# Patient Record
Sex: FEMALE | Race: BLACK OR AFRICAN AMERICAN | NOT HISPANIC OR LATINO | Employment: FULL TIME | URBAN - METROPOLITAN AREA
[De-identification: names, ages, dates, MRNs, and addresses within clinical notes are randomized per-mention and may not be internally consistent; named-entity substitution may affect disease eponyms.]

---

## 2022-11-09 ENCOUNTER — OFFICE VISIT (OUTPATIENT)
Dept: FAMILY MEDICINE | Facility: CLINIC | Age: 37
End: 2022-11-09
Payer: OTHER GOVERNMENT

## 2022-11-09 VITALS
OXYGEN SATURATION: 99 % | BODY MASS INDEX: 33.8 KG/M2 | WEIGHT: 198 LBS | DIASTOLIC BLOOD PRESSURE: 88 MMHG | HEART RATE: 78 BPM | SYSTOLIC BLOOD PRESSURE: 122 MMHG | HEIGHT: 64 IN

## 2022-11-09 DIAGNOSIS — Z00.01 ENCOUNTER FOR GENERAL ADULT MEDICAL EXAMINATION WITH ABNORMAL FINDINGS: Primary | ICD-10-CM

## 2022-11-09 DIAGNOSIS — M70.60 GREATER TROCHANTERIC BURSITIS, UNSPECIFIED LATERALITY: ICD-10-CM

## 2022-11-09 DIAGNOSIS — Z30.9 ENCOUNTER FOR CONTRACEPTIVE MANAGEMENT, UNSPECIFIED TYPE: ICD-10-CM

## 2022-11-09 PROBLEM — J30.9 ALLERGIC RHINITIS: Status: ACTIVE | Noted: 2022-11-09

## 2022-11-09 PROBLEM — M67.439 GANGLION OF WRIST: Status: ACTIVE | Noted: 2022-11-09

## 2022-11-09 PROBLEM — H52.229 REGULAR ASTIGMATISM: Status: ACTIVE | Noted: 2022-11-09

## 2022-11-09 PROCEDURE — 99385 PR PREVENTIVE VISIT,NEW,18-39: ICD-10-PCS | Mod: S$PBB,,, | Performed by: STUDENT IN AN ORGANIZED HEALTH CARE EDUCATION/TRAINING PROGRAM

## 2022-11-09 PROCEDURE — 99999 PR PBB SHADOW E&M-EST. PATIENT-LVL III: ICD-10-PCS | Mod: PBBFAC,,, | Performed by: STUDENT IN AN ORGANIZED HEALTH CARE EDUCATION/TRAINING PROGRAM

## 2022-11-09 PROCEDURE — 99213 OFFICE O/P EST LOW 20 MIN: CPT | Mod: PBBFAC,PO | Performed by: STUDENT IN AN ORGANIZED HEALTH CARE EDUCATION/TRAINING PROGRAM

## 2022-11-09 PROCEDURE — 99385 PREV VISIT NEW AGE 18-39: CPT | Mod: S$PBB,,, | Performed by: STUDENT IN AN ORGANIZED HEALTH CARE EDUCATION/TRAINING PROGRAM

## 2022-11-09 PROCEDURE — 99999 PR PBB SHADOW E&M-EST. PATIENT-LVL III: CPT | Mod: PBBFAC,,, | Performed by: STUDENT IN AN ORGANIZED HEALTH CARE EDUCATION/TRAINING PROGRAM

## 2022-11-09 RX ORDER — CHLORHEXIDINE GLUCONATE ORAL RINSE 1.2 MG/ML
SOLUTION DENTAL
COMMUNITY
Start: 2021-11-30

## 2022-11-09 RX ORDER — NORGESTIMATE AND ETHINYL ESTRADIOL 0.25-0.035
1 KIT ORAL DAILY
Qty: 90 TABLET | Refills: 4 | Status: SHIPPED | OUTPATIENT
Start: 2022-11-09 | End: 2022-11-10

## 2022-11-09 NOTE — PROGRESS NOTES
"Subjective:       Patient ID: Nisha Reyes is a 37 y.o. female.    Chief Complaint: paperwork (Needs paperwork filled out for the ) and Annual Exam    Presents to establish care, reports bilateral hip pain x years, incompletely relieved by corticosteroid injections, requests referral for consider of surgical management    Compliant with OCPs, denies adverse effects from medications    Review of Systems   Constitutional:  Negative for chills, fever and malaise/fatigue.   HENT:  Negative for congestion, ear pain, sinus pain and sore throat.    Respiratory:  Negative for cough, sputum production, shortness of breath and wheezing.    Cardiovascular:  Negative for chest pain and palpitations.   Gastrointestinal:  Negative for abdominal pain, constipation, diarrhea, heartburn, nausea and vomiting.   Genitourinary:  Negative for dysuria.   Musculoskeletal:  Negative for myalgias.   Skin:  Negative for rash.   Neurological:  Negative for dizziness, weakness and headaches.   Psychiatric/Behavioral:  Negative for depression. The patient does not have insomnia.       Objective:      Vitals:    11/09/22 0928   BP: 122/88   Pulse: 78   SpO2: 99%   Weight: 89.8 kg (197 lb 15.6 oz)   Height: 5' 4" (1.626 m)      Physical Exam  Constitutional:       General: She is not in acute distress.  Eyes:      General: No scleral icterus.     Conjunctiva/sclera: Conjunctivae normal.   Neck:      Comments: No cervical lymphadenopathy  Cardiovascular:      Comments: Good skin turgor, No edema  Pulmonary:      Comments: Respirations symmetric and not labored  Musculoskeletal:         General: Normal range of motion.      Cervical back: Neck supple.      Comments: Normal strength   Skin:     General: Skin is warm and dry.      Findings: No lesion or rash.   Neurological:      General: No focal deficit present.      Mental Status: She is alert and oriented to person, place, and time.   Psychiatric:         Mood and Affect: Mood normal.    "   Comments: Cooperative, Appropriate affect        Assessment:       1. Encounter for general adult medical examination with abnormal findings    2. Greater trochanteric bursitis, unspecified laterality    3. Encounter for contraceptive management, unspecified type          Plan:       Encounter for general adult medical examination with abnormal findings  Concerns and questions addressed, follow up preventative screenings, counseling and education provided    Greater trochanteric bursitis, unspecified laterality  -     Ambulatory referral/consult to Orthopedics; Future; Expected date: 11/16/2022  Refractory to corticosteroid injections, has previous imaging performed at OSH, will follow up orthopedic recommendations, paperwork completed in clinic today    Encounter for contraceptive management, unspecified type  -     norgestimate-ethinyl estradioL (ORTHO-CYCLEN) 0.25-35 mg-mcg per tablet; Take 1 tablet by mouth once daily.  Dispense: 90 tablet; Refill: 4  Stable, BP at goal, continue current regimen, will appreciate GYN recommendations tomorrow    Risks, benefits, alternatives discussed with patient, asked if there were any questions, patient said no  Precautions, counseling and education provided, patient verbalized understanding

## 2022-11-10 ENCOUNTER — OFFICE VISIT (OUTPATIENT)
Dept: ORTHOPEDICS | Facility: CLINIC | Age: 37
End: 2022-11-10
Payer: OTHER GOVERNMENT

## 2022-11-10 ENCOUNTER — HOSPITAL ENCOUNTER (OUTPATIENT)
Dept: RADIOLOGY | Facility: HOSPITAL | Age: 37
Discharge: HOME | End: 2022-11-10
Attending: NURSE PRACTITIONER
Payer: OTHER GOVERNMENT

## 2022-11-10 ENCOUNTER — OFFICE VISIT (OUTPATIENT)
Dept: OBSTETRICS AND GYNECOLOGY | Facility: CLINIC | Age: 37
End: 2022-11-10
Payer: OTHER GOVERNMENT

## 2022-11-10 VITALS
WEIGHT: 197.06 LBS | DIASTOLIC BLOOD PRESSURE: 76 MMHG | RESPIRATION RATE: 16 BRPM | SYSTOLIC BLOOD PRESSURE: 134 MMHG | HEIGHT: 64 IN | BODY MASS INDEX: 33.64 KG/M2

## 2022-11-10 VITALS — BODY MASS INDEX: 33.63 KG/M2 | WEIGHT: 197 LBS | HEIGHT: 64 IN

## 2022-11-10 DIAGNOSIS — M70.60 GREATER TROCHANTERIC BURSITIS, UNSPECIFIED LATERALITY: ICD-10-CM

## 2022-11-10 DIAGNOSIS — M25.551 BILATERAL HIP PAIN: ICD-10-CM

## 2022-11-10 DIAGNOSIS — M25.551 BILATERAL HIP PAIN: Primary | ICD-10-CM

## 2022-11-10 DIAGNOSIS — M25.552 BILATERAL HIP PAIN: Primary | ICD-10-CM

## 2022-11-10 DIAGNOSIS — M25.552 BILATERAL HIP PAIN: ICD-10-CM

## 2022-11-10 DIAGNOSIS — Z01.419 WELL WOMAN EXAM: Primary | ICD-10-CM

## 2022-11-10 DIAGNOSIS — M25.559 PAIN IN UNSPECIFIED HIP: Primary | ICD-10-CM

## 2022-11-10 PROCEDURE — 73521 X-RAY EXAM HIPS BI 2 VIEWS: CPT | Mod: 26,,, | Performed by: RADIOLOGY

## 2022-11-10 PROCEDURE — 99385 PR PREVENTIVE VISIT,NEW,18-39: ICD-10-PCS | Mod: S$PBB,,, | Performed by: STUDENT IN AN ORGANIZED HEALTH CARE EDUCATION/TRAINING PROGRAM

## 2022-11-10 PROCEDURE — 73521 XR HIPS BILATERAL 2 VIEW INCL AP PELVIS: ICD-10-PCS | Mod: 26,,, | Performed by: RADIOLOGY

## 2022-11-10 PROCEDURE — 73521 X-RAY EXAM HIPS BI 2 VIEWS: CPT | Mod: TC,PO

## 2022-11-10 PROCEDURE — 99214 PR OFFICE/OUTPT VISIT, EST, LEVL IV, 30-39 MIN: ICD-10-PCS | Mod: S$PBB,25,, | Performed by: NURSE PRACTITIONER

## 2022-11-10 PROCEDURE — 99214 OFFICE O/P EST MOD 30 MIN: CPT | Mod: S$PBB,25,, | Performed by: NURSE PRACTITIONER

## 2022-11-10 PROCEDURE — 99213 OFFICE O/P EST LOW 20 MIN: CPT | Mod: PBBFAC,PN | Performed by: STUDENT IN AN ORGANIZED HEALTH CARE EDUCATION/TRAINING PROGRAM

## 2022-11-10 PROCEDURE — 99999 PR PBB SHADOW E&M-EST. PATIENT-LVL III: CPT | Mod: PBBFAC,,, | Performed by: STUDENT IN AN ORGANIZED HEALTH CARE EDUCATION/TRAINING PROGRAM

## 2022-11-10 PROCEDURE — 99214 OFFICE O/P EST MOD 30 MIN: CPT | Mod: PBBFAC,27,PN,25 | Performed by: NURSE PRACTITIONER

## 2022-11-10 PROCEDURE — 20610 LARGE JOINT ASPIRATION/INJECTION: BILATERAL GREATER TROCHANTERIC BURSA: ICD-10-PCS | Mod: 50,S$PBB,, | Performed by: NURSE PRACTITIONER

## 2022-11-10 PROCEDURE — 99999 PR PBB SHADOW E&M-EST. PATIENT-LVL III: ICD-10-PCS | Mod: PBBFAC,,, | Performed by: STUDENT IN AN ORGANIZED HEALTH CARE EDUCATION/TRAINING PROGRAM

## 2022-11-10 PROCEDURE — 87624 HPV HI-RISK TYP POOLED RSLT: CPT | Performed by: STUDENT IN AN ORGANIZED HEALTH CARE EDUCATION/TRAINING PROGRAM

## 2022-11-10 PROCEDURE — 20610 DRAIN/INJ JOINT/BURSA W/O US: CPT | Mod: 50,PBBFAC,PN | Performed by: NURSE PRACTITIONER

## 2022-11-10 PROCEDURE — 88175 CYTOPATH C/V AUTO FLUID REDO: CPT | Performed by: STUDENT IN AN ORGANIZED HEALTH CARE EDUCATION/TRAINING PROGRAM

## 2022-11-10 PROCEDURE — 99999 PR PBB SHADOW E&M-EST. PATIENT-LVL IV: CPT | Mod: PBBFAC,,, | Performed by: NURSE PRACTITIONER

## 2022-11-10 PROCEDURE — 99385 PREV VISIT NEW AGE 18-39: CPT | Mod: S$PBB,,, | Performed by: STUDENT IN AN ORGANIZED HEALTH CARE EDUCATION/TRAINING PROGRAM

## 2022-11-10 PROCEDURE — 20610 DRAIN/INJ JOINT/BURSA W/O US: CPT | Mod: 50,S$PBB,, | Performed by: NURSE PRACTITIONER

## 2022-11-10 PROCEDURE — 99999 PR PBB SHADOW E&M-EST. PATIENT-LVL IV: ICD-10-PCS | Mod: PBBFAC,,, | Performed by: NURSE PRACTITIONER

## 2022-11-10 RX ORDER — TRIAMCINOLONE ACETONIDE 40 MG/ML
40 INJECTION, SUSPENSION INTRA-ARTICULAR; INTRAMUSCULAR
Status: DISCONTINUED | OUTPATIENT
Start: 2022-11-10 | End: 2022-11-13 | Stop reason: HOSPADM

## 2022-11-10 RX ORDER — NORETHINDRONE ACETATE AND ETHINYL ESTRADIOL AND FERROUS FUMARATE 1MG-20(24)
1 KIT ORAL DAILY
Qty: 90 TABLET | Refills: 3 | Status: SHIPPED | OUTPATIENT
Start: 2022-11-10 | End: 2023-11-10

## 2022-11-10 RX ADMIN — TRIAMCINOLONE ACETONIDE 40 MG: 40 INJECTION, SUSPENSION INTRA-ARTICULAR; INTRAMUSCULAR at 11:11

## 2022-11-10 NOTE — PROCEDURES
Large Joint Aspiration/Injection: bilateral greater trochanteric bursa    Date/Time: 11/10/2022 11:20 AM  Performed by: DAWIT Menchaca  Authorized by: DAWIT Menchaca     Consent Done?:  Yes (Verbal)  Indications:  Pain  Timeout: prior to procedure the correct patient, procedure, and site was verified    Prep: patient was prepped and draped in usual sterile fashion      Local anesthesia used?: Yes    Local anesthetic:  Lidocaine 1% without epinephrine  Anesthetic total (ml):  5      Details:  Needle Size:  21 G  Approach:  Lateral  Location:  Hip  Laterality:  Bilateral  Site:  Bilateral greater trochanteric bursa  Medications (Right):  40 mg triamcinolone acetonide 40 mg/mL  Medications (Left):  40 mg triamcinolone acetonide 40 mg/mL  Patient tolerance:  Patient tolerated the procedure well with no immediate complications

## 2022-11-10 NOTE — PROGRESS NOTES
Chief Complaint   Patient presents with    Left Hip - Pain    Right Hip - Pain      HPI:   This is a 37 y.o. female who presents to clinic today for bilateral hip pain for the past > 10 years after no known trauma. Complains of pain while sleeping on side and when descending stairs. Pain is deep. No numbness or tingling. No associated signs or symptoms. Referred here by PCP, Dr. Prince for bilateral hip bursitis that she has had corticosteroid injections for in the past with no relief. Referred here more for surgical evaluation; possible bursectomy. She has significant pain with running, dead lifts. Other activities, cause pain to her bilateral hips also that include running and sometimes running with weights which I have discussed with her that she needs activity modification restrictions... meaning that she should NOT be performing any activities that cause her the pain such as dead lifts, running or running with any lbs of weight.   We discussed steroid injections to bilateral hip bursa and she is agreeable to do this, and she will follow up with Dr. Carolina near end of December to discuss possible bilateral bursectomy.       Past Medical History:   Diagnosis Date    Gestational HTN     Preeclampsia      Past Surgical History:   Procedure Laterality Date    PRK eye      right ganglion cyst removal Right     WISDOM TOOTH EXTRACTION       Current Outpatient Medications on File Prior to Visit   Medication Sig Dispense Refill    chlorhexidine (PERIDEX) 0.12 % solution       norgestimate-ethinyl estradioL (ORTHO-CYCLEN) 0.25-35 mg-mcg per tablet Take 1 tablet by mouth once daily. 90 tablet 4     No current facility-administered medications on file prior to visit.     Review of patient's allergies indicates:  No Known Allergies  History reviewed. No pertinent family history.  Social History     Socioeconomic History    Marital status: Legally    Tobacco Use    Smoking status: Never    Smokeless tobacco:  Never       Review of Systems:  Constitutional:  Denies fever or chills   Eyes:  Denies change in visual acuity   HENT:  Denies nasal congestion or sore throat   Respiratory:  Denies cough or shortness of breath   Cardiovascular:  Denies chest pain or edema   GI:  Denies abdominal pain, nausea, vomiting, bloody stools or diarrhea   :  Denies dysuria   Integument:  Denies rash   Neurologic:  Denies headache, focal weakness or sensory changes   Endocrine:  Denies polyuria or polydipsia   Lymphatic:  Denies swollen glands   Psychiatric:  Denies depression or anxiety     Physical Exam:   Constitutional:  Well developed, well nourished, no acute distress, non-toxic appearance   Integument:  Well hydrated  Neurologic:  Alert & oriented x 3  Psychiatric:  Speech and behavior appropriate     Bilateral Hip Exam    Neither Hip Exam   Neither hip exam performed same as contralateral hip and is normal.     Bilateral Hip Exam   Tenderness   The patient is experiencing tenderness in the greater trochanter.  Range of Motion   The patient has normal hip ROM.  Muscle Strength   Abduction: 4/5   Other   Erythema: absent  Sensation: normal  Pulse: present          X-rays were personally reviewed by me and findings discussed with the patient.  2 views of the bilateral hip show no fractures or dislocations.           Greater trochanteric bursitis, unspecified laterality  -     Ambulatory referral/consult to Orthopedics  -     Large Joint Aspiration/Injection: bilateral greater trochanteric bursa       Using an aseptic technique, I injected 5 cc of lidocaine 1% without and 1 cc of kenalog 40mg into the bilateral Hip.    RTC end of December as scheduled to discuss bilateral bursectomy with Dr. Carolina.

## 2022-11-10 NOTE — PROGRESS NOTES
History & Physical  Gynecology      SUBJECTIVE:     Chief Complaint: Establish Care and Well Woman       History of Present Illness:    37 y.o. here today for well woman exam. Doing well no issues.     Gyn: regular periods, on OCPs, OCPS and condoms for BC. Remote hx of abnormal pap . No immediate FH of gyn or colon cancer.     No tobacco    [employer]  in [place]       Review of patient's allergies indicates:  No Known Allergies    Past Medical History:   Diagnosis Date    Gestational HTN     Preeclampsia      Past Surgical History:   Procedure Laterality Date    PRK eye      right ganglion cyst removal Right     WISDOM TOOTH EXTRACTION       OB History          2    Para   2    Term   2            AB        Living   2         SAB        IAB        Ectopic        Multiple        Live Births   2               Family History   Problem Relation Age of Onset    Breast cancer Cousin     Colon cancer Neg Hx     Ovarian cancer Neg Hx      Social History     Tobacco Use    Smoking status: Never    Smokeless tobacco: Never   Substance Use Topics    Alcohol use: Yes     Comment: Wine    Drug use: Never       Current Outpatient Medications   Medication Sig    chlorhexidine (PERIDEX) 0.12 % solution     norethindrone-e.estradioL-iron (JUNEL FE 24) 1 mg-20 mcg (24)/75 mg (4) per tablet Take 1 tablet by mouth once daily.     No current facility-administered medications for this visit.     Facility-Administered Medications Ordered in Other Visits   Medication    triamcinolone acetonide injection 40 mg    triamcinolone acetonide injection 40 mg         Review of Systems:  Review of Systems   Constitutional:  Negative for chills, fatigue and fever.   HENT:  Negative for congestion.    Eyes:  Negative for visual disturbance.   Respiratory:  Negative for cough and shortness of breath.    Cardiovascular:  Negative for chest pain and palpitations.   Gastrointestinal:  Negative for abdominal distention, abdominal  pain, constipation, diarrhea, nausea and vomiting.   Genitourinary:  Negative for difficulty urinating, dysuria, hematuria, vaginal bleeding and vaginal discharge.   Skin:  Negative for rash.   Neurological:  Negative for dizziness, seizures, light-headedness and headaches.   Hematological:  Does not bruise/bleed easily.   Psychiatric/Behavioral:  Negative for dysphoric mood. The patient is not nervous/anxious.       OBJECTIVE:     Physical Exam:  Physical Exam  Vitals reviewed.   Constitutional:       General: She is not in acute distress.     Appearance: Normal appearance. She is well-developed.   HENT:      Head: Normocephalic and atraumatic.   Cardiovascular:      Rate and Rhythm: Normal rate and regular rhythm.   Pulmonary:      Effort: Pulmonary effort is normal.   Chest:   Breasts:     Right: No inverted nipple, mass, nipple discharge, skin change or tenderness.      Left: No inverted nipple, mass, nipple discharge, skin change or tenderness.   Abdominal:      General: There is no distension.      Palpations: Abdomen is soft.      Tenderness: There is no abdominal tenderness.   Genitourinary:     Vagina: Normal.      Comments: Normal external female genitalia, normal hair distribution. Vaginal mucosa pink, moist, well rugated, scant white physiologic discharge. No blood in vault. Cervix pink, non-friable, without lesion. No CMT. Uterus non tender, mobile, not enlarged. Adnexa without fullness or tenderness.    Skin:     General: Skin is warm.   Neurological:      Mental Status: She is alert and oriented to person, place, and time.   Psychiatric:         Behavior: Behavior normal.         Thought Content: Thought content normal.         Judgment: Judgment normal.         ASSESSMENT:       ICD-10-CM ICD-9-CM    1. Well woman exam  Z01.419 V72.31 Liquid-Based Pap Smear, Screening      HPV High Risk Genotypes, PCR          Orders Placed This Encounter   Procedures    HPV High Risk Genotypes, PCR     Release to  patient->Immediate     Order Specific Question:   Source     Answer:   Cervix     Order Specific Question:   Release to patient     Answer:   Immediate           Plan:   Well woman:  - Pap cotest today   - MMG @40  - colonoscopy n/a  - tobacco cessation n/a  - contraception OCP  - HPV vaccine s/p 1 dose   - Safe Sex discussed   - Counseled to take daily multivitamin. If patient is of reproductive age and not on contraception, to take prenatal vitamin. Patient has been counseled on the vitamin D and calcium requirements per ACOG recommendations.  Age   Calcium(mg/day)   Vitamin D (IU/day)  9-18    1300                       600  19-50  1000                       600  51-70  1200                       600  >70      1,200                      800  Patient to start daily vitamin   - Patient counseled extensively on OCP use. Contraindications for OCPs include h/o VTE, age > 35yr and smoking, migraines with auras. Counseled that OCPs do not protect against STDs or HIV. Advised that patient can start OCP pack today, however backup contraception should be employed for the first 2 weeks. If she misses a day, she can take 2 pills the following day. However if she misses 2 days in a row, she should start a new pack. All questions answered and patient voiced understanding.      Etelvina Hutchins M.D.  Obstetrics and Gynecology

## 2023-02-17 ENCOUNTER — TELEPHONE (OUTPATIENT)
Dept: FAMILY MEDICINE | Facility: CLINIC | Age: 38
End: 2023-02-17
Payer: OTHER GOVERNMENT

## 2023-07-26 ENCOUNTER — PATIENT OUTREACH (OUTPATIENT)
Dept: ADMINISTRATIVE | Facility: HOSPITAL | Age: 38
End: 2023-07-26
Payer: OTHER GOVERNMENT

## 2023-07-26 ENCOUNTER — PATIENT MESSAGE (OUTPATIENT)
Dept: ADMINISTRATIVE | Facility: HOSPITAL | Age: 38
End: 2023-07-26
Payer: OTHER GOVERNMENT

## 2023-07-26 NOTE — PROGRESS NOTES
Population Health Chart Review & Patient Outreach Details:     Reason for Outreach Encounter:     []  Non-Compliant Report   []  Payor Report ([employer], PHN, BCBS, MSSP, MCIP, UHC, etc.)   []  Pre-Visit Chart Review     Updates Requested / Reviewed:     []  Care Everywhere    []     []  External Sources ([facility], [facility], DIS, etc.)   []  Care Team Updated    Patient Outreach Method:    [x]  Telephone Outreach Completed   [] Successful   [x] Left Voicemail   [] Unable to Contact (wrong number, no voicemail)  [x]  [facility] Outreach Sent  []  Letter Outreach Mailed  []  Fax Sent for External Records  []  External Records Upload    Health Maintenance Topics Addressed and Outreach Outcomes / Actions Taken:        []      Breast Cancer Screening []  Mammo Scheduled      []  External Records Requested     []  Added Reminder to Complete to Upcoming Primary Care Appt Notes     []  Patient Declined     []  Patient Will Call Back to Schedule     []  Patient Will Schedule with External Provider / Order Routed if Applicable             []       Cervical Cancer Screening []  Pap Scheduled      []  External Records Requested     []  Added Reminder to Complete to Upcoming Primary Care Appt Notes     []  Patient Declined     []  Patient Will Call Back to Schedule     []  Patient Will Schedule with External Provider               []          Colorectal Cancer Screening []  Colonoscopy Case Request or Referral Placed     []  External Records Requested     []  Added Reminder to Complete to Upcoming Primary Care Appt Notes     []  Patient Declined     []  Patient Will Call Back to Schedule     []  Patient Will Schedule with External Provider     []  Fit Kit Mailed (add the SmartPhrase under additional notes)     []  Reminded Patient to Complete Home Test             []      Diabetic Eye Exam []  Eye Camera Scheduled or Optometry Referral Placed     []  External Records Requested     []  Added Reminder to Complete to  Upcoming Primary Care Appt Notes     []  Patient Declined     []  Patient Will Call Back to Schedule     []  Patient Will Schedule with External Provider             []      Blood Pressure Control []  Primary Care Follow Up Visit Scheduled     []  Remote Blood Pressure Reading Captured     []  Added Reminder to Complete to Upcoming Primary Care Appt Notes     []  Patient Declined     []  Patient Will Call Back / Patient Will Send Portal Message with Reading     []  Patient Will Call Back to Schedule Provider Visit             []       HbA1c & Other Labs []  Lab Appt Scheduled for Due Labs     []  Primary Care Follow Up Visit Scheduled      []  Reminded Patient to Complete Home Test     []  Added Reminder to Complete to Upcoming Primary Care Appt Notes     []  Patient Declined     []  Patient Will Call Back to Schedule     []  Patient Will Schedule with External Provider / Order Routed if Applicable           [x]    Schedule Primary Care Appt []  Primary Care Appt Scheduled     []  Patient Declined     []  Patient Will Call Back to Schedule     []  Pt Established with External Provider & Updated Care Team             []      Medication Adherence []  Primary Care Appointment Scheduled     []  Added Reminder to Upcoming Primary Care Appt Notes     []  Patient Reminded to  Prescription     []  Patient Declined, Provider Notified if Needed     []  Sent Provider Message to Review and/or Add Exclusion to Problem List             []      Osteoporosis Screening []  DXA Appointment Scheduled     []  External Records Requested     []  Added Reminder to Complete to Upcoming Primary Care Appt Notes     []  Patient Declined     []  Patient Will Call Back to Schedule     []  Patient Will Schedule with External Provider / Order Routed if Applicable     Additional Care Coordinator Notes:         Further Action Needed If Patient Returns Outreach: